# Patient Record
Sex: MALE | Race: WHITE
[De-identification: names, ages, dates, MRNs, and addresses within clinical notes are randomized per-mention and may not be internally consistent; named-entity substitution may affect disease eponyms.]

---

## 2018-01-05 ENCOUNTER — HOSPITAL ENCOUNTER (INPATIENT)
Dept: HOSPITAL 11 - JP.MS | Age: 83
LOS: 5 days | Discharge: HOME HEALTH | DRG: 195 | End: 2018-01-10
Attending: INTERNAL MEDICINE | Admitting: INTERNAL MEDICINE
Payer: MEDICARE

## 2018-01-05 DIAGNOSIS — I25.10: ICD-10-CM

## 2018-01-05 DIAGNOSIS — R60.9: ICD-10-CM

## 2018-01-05 DIAGNOSIS — Z79.01: ICD-10-CM

## 2018-01-05 DIAGNOSIS — R19.7: ICD-10-CM

## 2018-01-05 DIAGNOSIS — Z86.73: ICD-10-CM

## 2018-01-05 DIAGNOSIS — Z79.84: ICD-10-CM

## 2018-01-05 DIAGNOSIS — I10: ICD-10-CM

## 2018-01-05 DIAGNOSIS — Z95.2: ICD-10-CM

## 2018-01-05 DIAGNOSIS — Z87.891: ICD-10-CM

## 2018-01-05 DIAGNOSIS — E11.65: ICD-10-CM

## 2018-01-05 DIAGNOSIS — H10.33: ICD-10-CM

## 2018-01-05 DIAGNOSIS — J18.1: Primary | ICD-10-CM

## 2018-01-05 DIAGNOSIS — R09.02: ICD-10-CM

## 2018-01-05 DIAGNOSIS — Z66: ICD-10-CM

## 2018-01-06 RX ADMIN — LEVOFLOXACIN SCH MLS/HR: 750 INJECTION, SOLUTION INTRAVENOUS at 08:47

## 2018-01-06 RX ADMIN — DILTIAZEM HYDROCHLORIDE SCH: 60 CAPSULE, EXTENDED RELEASE ORAL at 11:10

## 2018-01-06 RX ADMIN — DEXTROSE SCH MG: 50 INJECTION, SOLUTION INTRAVENOUS at 18:50

## 2018-01-06 RX ADMIN — POTASSIUM CHLORIDE SCH MEQ: 1500 TABLET, EXTENDED RELEASE ORAL at 08:46

## 2018-01-06 RX ADMIN — GUAIFENESIN AND CODEINE PHOSPHATE SCH ML: 10; 100 LIQUID ORAL at 13:37

## 2018-01-06 RX ADMIN — POTASSIUM CHLORIDE SCH MEQ: 1500 TABLET, EXTENDED RELEASE ORAL at 16:43

## 2018-01-06 RX ADMIN — GUAIFENESIN AND CODEINE PHOSPHATE SCH ML: 10; 100 LIQUID ORAL at 21:34

## 2018-01-06 RX ADMIN — GUAIFENESIN AND CODEINE PHOSPHATE SCH ML: 10; 100 LIQUID ORAL at 08:47

## 2018-01-06 RX ADMIN — DEXTROSE SCH MG: 50 INJECTION, SOLUTION INTRAVENOUS at 10:29

## 2018-01-06 NOTE — PCM.HP
H&P History of Present Illness





- General


Date of Service: 18


Admit Problem/Dx: 


 Admission Diagnosis/Problem





Admission Diagnosis/Problem      Pneumonia








Source of Information: Patient, EMS Notes Reviewed


History Limitations: Reports: Respiratory Distress





- History of Present Illness


Initial Comments - Free Text/Narative: 





Pneumonia; this is a 86 year old male, who is a direct admit from [facility] ER, there hospital is divert, therefore we are accepting admission 

for further care and treatment. Mr. Petersen arrrives by EMS.





Mr. Petersen reports a 4 day history of cough, congestion, bilateral eye 

infection. He became progressively weaker with shortness of breath came to ER 

for treatment from his home at [facility]. In the ER  he 

had labs which show a WBC 12,000, lactic acid 2.7, influenza A and B negative, 

Chest xray right lower lobe infiltrates. He was given one liter of fluid, 

Levaquin 750mg IV, Tamiflu 75mg po once, oxygen at 4liter per NC O2 sat at 90%.





Mr. Petersen has COPD, has home bi-pap for nighttime with oxygen liter flow at 5. 





Surgeries; 2013; open heart for valve replacement





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





Social; , Wife of 62 .5 years  3 year ago from complication of 

arthritis, children and extended family in the area, retired Farmer of crops 

and cattle. lives alone in [facility].








Onset of Symptoms: Reports: Gradual


Duration of Symptoms: Reports: Day(s):


Location: Reports: Generalized


Quality: Reports: Other (shortness of breath)


Severity: Severe


Improves with: Reports: None


Worsens with: Reports: None


Associated Symptoms: Reports: Cough, Fever/Chills, Loss of Appetite, Malaise, 

Shortness of Breath, Weakness


  ** Bilateral Eye


Pain Score (Numeric/FACES): 2





- Related Data


Allergies/Adverse Reactions: 


 Allergies











Allergy/AdvReac Type Severity Reaction Status Date / Time


 


No Known Allergies Allergy   Verified 18 01:03











Home Medications: 


 Home Meds





Acetaminophen [Mapap] 650 mg PO Q6H PRN 18 [History]


Acetaminophen/HYDROcodone [Norco 325-5 MG] 0.5 - 1 tab PO Q4H PRN 18 [

History]


Albuterol Sulfate 2.5 mg IH Q4H PRN 18 [History]


Amiodarone [Cordarone] 200 mg PO QAM 18 [History]


Ascorbic Acid 1,000 mg PO DAILY 18 [History]


Capsaicin [Zostrix 0.025% Crm] 0.025 gm TP ASDIRECTED PRN 18 [History]


Digoxin 125 mcg PO DAILY 18 [History]


Diltiazem [Cardizem SR] 60 mg PO DAILY 18 [History]


Docusate Sodium 100 mg PO BID 18 [History]


Ferrous Sulfate 325 mg PO BID 18 [History]


Folic Acid 1 mg PO DAILY 18 [History]


Furosemide [Lasix] 40 mg PO BID 18 [History]


Gentamicin [Gentak 0.3% Ophth Oint] 1 - 2 drop EYEBOTH Q4HWA 18 [History]


Metoprolol Tartrate 25 mg PO BID 18 [History]


Multivits,Ca,Min/Iron/FA/Lycop [Centrum Men's Tablet] 1 tab PO QAM 18 [

History]


Omega-3S/DHA/Epa/Fish Oil [Fish Oil Omega-3 Softgel] 1 cap PO BID 18 [

History]


Omeprazole 20 mg PO BIDAC 18 [History]


Polyvinyl Alcohol [Liquitears] 1 drop EYEBOTH ASDIRECTED PRN 18 [History]


Potassium Chloride [Klor-Con M20] 40 meq PO BID 18 [History]


Warfarin Sodium [Coumadin] 4 mg PO DAILY 18 [History]


diphenhydrAMINE [Benadryl] 50 mg PO BEDTIME PRN 18 [History]











Past Medical History


Cardiovascular History: Reports: Heart Valve Replacement


Other Cardiovascular History: 2 valves


Neurological History: Reports: CVA





- Past Surgical History


Cardiovascular Surgical History: Reports: Valve Replacement





Social & Family History





- Tobacco Use


Smoking Status *Q: Former Smoker


Used Tobacco, but Quit: Yes


Month Tobacco Last Used: 35 years ago


Second Hand Smoke Exposure: No





- Caffeine Use


Caffeine Use: Reports: None





- Recreational Drug Use


Recreational Drug Use: No





- Living Situation & Occupation


Living situation: Reports: 


Occupation: Retired (wife  3 years ago,  for 62.5 years, children 

and extended family in the area, retired Farmer of crops and cattle. lives in 

[address].)





H&P Review of Systems





- Review of Systems:


Review Of Systems: See Below


General: Reports: Fever, Chills, Malaise, Weakness, Fatigue, Decreased Appetite


HEENT: Reports: Rhinitis, Post Nasal Drip, Sinus Congestion, Visual Changes (

bilateral acute eye infections)


Pulmonary: Reports: Shortness of Breath, Wheezing, Pleuritic Chest Pain, Cough, 

Sputum, Other (home Bi-pap with 5 liter of oxygen at night. does not use O2 

during the day.)


Cardiovascular: Reports: Dyspnea on Exertion


Genitourinary: Reports: No Symptoms


Musculoskeletal: Reports: No Symptoms


Skin: Reports: No Symptoms


Psychiatric: Reports: No Symptoms


Neurological: Reports: No Symptoms


Hematologic/Lymphatic: Reports: No Symptoms


Immunologic: Reports: No Symptoms





Exam





- Exam


Exam: See Below





- Vital Signs


Vital Signs: 


 Last Vital Signs











Temp  36.4 C   18 07:26


 


Pulse  60   18 07:26


 


Resp  18   18 07:26


 


BP  118/65   18 07:26


 


Pulse Ox  94 L  18 07:26











Weight: 111.754 kg





- Exam


Quality Assessment: Supplemental Oxygen


General: Alert, Oriented, Cooperative, Mild Distress


HEENT: Pupils Equal, Rhinitis (thick green/yellow discharge), Other (bilateral 

conjunctivitis; sclera injection, mucupurulent discharge from left eye. facial 

edema.)


Neck: Supple, Trachea Midline


Lungs: Decreased Breath Sounds, Crackles, Rales (bilateral)


Cardiovascular: Regular Rate, Regular Rhythm, Normal S1, Normal S2, Other (mid-

line old well healed surgical incision from open heart surgery.)


GI/Abdominal Exam: Normal Bowel Sounds, Soft, Non-Tender, Other (large obese 

abdomen, normal shape and size per patient.)


 (Male) Exam: Deferred


Rectal (Males) Exam: Deferred


Back Exam: Normal Inspection, Full Range of Motion


Extremities: Normal Inspection, Normal Range of Motion, Non-Tender, No Pedal 

Edema, Normal Capillary Refill


Peripheral Pulses: 2+: Posterior Tibial (L), Posterior Tibial (R), Dorsalis 

Pedis (L), Dorsalis Pedis (R)


Skin: Warm, Dry, Intact, Other (bilateral erythema surrounding eyes.)


Neurological: Cranial Nerves Intact, Reflexes Equal Bilateral


Neuro Extensive - Mental Status: Alert, Oriented x3, Normal Mood/Affect, Normal 

Cognition, Memory Intact


Neuro Extensive - Motor, Sensory, Reflexes: Motor/Sensory Deficits


Psychiatric: Alert, Normal Affect, Normal Mood





- Patient Data


Lab Results Last 24 hrs: 


 Laboratory Results - last 24 hr











  18 Range/Units





  01:21 01:21 05:50 


 


WBC    11.5 H  (4.5-11.0)  K/uL


 


RBC    4.87  (4.30-5.90)  M/uL


 


Hgb    14.8  (12.0-15.0)  g/dL


 


Hct    45.7  (40.0-54.0)  %


 


MCV    94  (80-98)  fL


 


MCH    30  (27-31)  pg


 


MCHC    32  (32-36)  %


 


Plt Count    156  (150-400)  K/uL


 


Neut % (Auto)    90 H  (36-66)  %


 


Lymph % (Auto)    6 L  (24-44)  %


 


Mono % (Auto)    4  (2-6)  %


 


Eos % (Auto)    0 L  (2-4)  %


 


Baso % (Auto)    0  (0-1)  %


 


PT     (9.5-12.0)  sec


 


INR     (0.80-1.20)  


 


Sodium     (140-148)  mmol/L


 


Potassium     (3.6-5.2)  mmol/L


 


Chloride     (100-108)  mmol/L


 


Carbon Dioxide     (21-32)  mmol/L


 


Anion Gap     (5.0-14.0)  mmol/L


 


BUN     (7-18)  mg/dL


 


Creatinine     (0.8-1.3)  mg/dL


 


Est Cr Clr Drug Dosing     mL/min


 


Estimated GFR (MDRD)     (>60)  


 


Glucose     ()  mg/dL


 


Lactic Acid   2.4 H   (0.4-2.0)  mmol/L


 


Calcium     (8.5-10.1)  mg/dL


 


Total Bilirubin     (0.2-1.0)  mg/dL


 


AST     (15-37)  U/L


 


ALT     (12-78)  U/L


 


Alkaline Phosphatase     ()  U/L


 


C-Reactive Protein  17.42 H    (0.0-0.3)  mg/dL


 


Total Protein     (6.4-8.2)  g/dL


 


Albumin     (3.4-5.0)  g/dL


 


Globulin     (2.3-3.5)  g/dL


 


Albumin/Globulin Ratio     (1.2-2.2)  














  18 Range/Units





  05:50 05:50 


 


WBC    (4.5-11.0)  K/uL


 


RBC    (4.30-5.90)  M/uL


 


Hgb    (12.0-15.0)  g/dL


 


Hct    (40.0-54.0)  %


 


MCV    (80-98)  fL


 


MCH    (27-31)  pg


 


MCHC    (32-36)  %


 


Plt Count    (150-400)  K/uL


 


Neut % (Auto)    (36-66)  %


 


Lymph % (Auto)    (24-44)  %


 


Mono % (Auto)    (2-6)  %


 


Eos % (Auto)    (2-4)  %


 


Baso % (Auto)    (0-1)  %


 


PT   40.8 H  (9.5-12.0)  sec


 


INR   3.61 H  (0.80-1.20)  


 


Sodium  140   (140-148)  mmol/L


 


Potassium  3.9   (3.6-5.2)  mmol/L


 


Chloride  102   (100-108)  mmol/L


 


Carbon Dioxide  31   (21-32)  mmol/L


 


Anion Gap  7.4   (5.0-14.0)  mmol/L


 


BUN  28 H   (7-18)  mg/dL


 


Creatinine  0.9   (0.8-1.3)  mg/dL


 


Est Cr Clr Drug Dosing  60.83   mL/min


 


Estimated GFR (MDRD)  > 60   (>60)  


 


Glucose  202 H   ()  mg/dL


 


Lactic Acid    (0.4-2.0)  mmol/L


 


Calcium  7.5 L   (8.5-10.1)  mg/dL


 


Total Bilirubin  0.7   (0.2-1.0)  mg/dL


 


AST  46 H   (15-37)  U/L


 


ALT  41   (12-78)  U/L


 


Alkaline Phosphatase  62   ()  U/L


 


C-Reactive Protein    (0.0-0.3)  mg/dL


 


Total Protein  4.4 L   (6.4-8.2)  g/dL


 


Albumin  1.4 L   (3.4-5.0)  g/dL


 


Globulin  3.0   (2.3-3.5)  g/dL


 


Albumin/Globulin Ratio  0.5 L   (1.2-2.2)  











Result Diagrams: 


 18 05:50





 18 05:50


Mark Anthony Results Last 24 hrs: 


 Microbiology











 18 01:36 Gram Stain - Final





 Sputum - Expectorated 














*Q Meaningful Use (ADM)





- VTE *Q


VTE Criteria *Q: 








- Stroke *Q


Stroke Criteria *Q: 








- AMI *Q


AMI Criteria *Q: 








- Problem List


(1) Pneumonia


SNOMED Code(s): 710736835


   ICD Code: J18.9 - PNEUMONIA, UNSPECIFIED ORGANISM   Status: Acute   Priority

: High   Current Visit: Yes   


Qualifiers: 


   Pneumonia type: due to unspecified organism   Laterality: right   Lung 

location: lower lobe of lung   Qualified Code(s): J18.1 - Lobar pneumonia, 

unspecified organism   





(2) HTN (hypertension)


SNOMED Code(s): 23294281


   ICD Code: I10 - ESSENTIAL (PRIMARY) HYPERTENSION   Status: Acute   Priority: 

High   Current Visit: Yes   


Qualifiers: 


   Hypertension type: essential hypertension   Qualified Code(s): I10 - 

Essential (primary) hypertension   





(3) Conjunctivitis of both eyes


SNOMED Code(s): 3760881


   ICD Code: H10.9 - UNSPECIFIED CONJUNCTIVITIS   Status: Acute   Priority: 

High   Current Visit: Yes   


Qualifiers: 


   Conjunctivitis type: acute   Acute conjunctivitis type: bacterial   

Qualified Code(s): H10.33 - Unspecified acute conjunctivitis, bilateral   





(4) History of artificial heart valve


Status: Acute   Priority: High   Current Visit: Yes   


Problem List Initiated/Reviewed/Updated: Yes


Orders Last 24hrs: 


 Active Orders 24 hr











 Category Date Time Status


 


 Patient Status [ADT] Routine ADT  18 01:04 Active


 


 Bedrest Bathroom Privileges [RC] ASDIRECTED Care  18 01:04 Active


 


 Intake and Output [RC] QSHIFT Care  18 01:04 Active


 


 Notify Provider Vital Signs [RC] ASDIRECTED Care  18 01:04 Active


 


 Oxygen Therapy [RC] PRN Care  18 01:04 Active


 


 Pulse Oximetry [RC] CONTINUOUS Care  18 01:04 Active


 


 RT Aerosol Therapy [RC] ASDIRECTED Care  18 01:04 Active


 


 RT Incentive Spirometry [RC] ASDIRECTED Care  18 01:04 Active


 


 VTE/DVT Education [RC] Per Unit Routine Care  18 01:04 Active


 


 Vital Signs [RC] Q4H Care  18 01:04 Active


 


 OT Evaluation and Treatment [CONS] Routine Cons  18 01:04 Active


 


 PT Evaluation and Treatment [CONS] Routine Cons  18 01:04 Active


 


 Regular Diet [DIET] Diet  18 Breakfast Active


 


 Chest 1V Frontal [CR] AM Exams  18 05:11 Taken


 


 CULTURE RESPIRATORY + SMEAR [RM] Stat Lab  18 01:36 Results


 


 UA W/MICROSCOPIC [URIN] Urgent Lab  18 01:04 Uncollected


 


 Acetaminophen [Tylenol] Med  18 01:04 Active





 650 mg PO Q4H PRN   


 


 Acetaminophen/HYDROcodone [Norco 325-5 MG] Med  18 01:04 Active





 1 tab PO Q4H PRN   


 


 Albuterol [Proventil Neb Soln] Med  18 01:04 Active





 2.5 mg NEB Q4H PRN   


 


 Albuterol/Ipratropium [DuoNeb 3.0-0.5 MG/3 ML] Med  18 07:00 Active





 3 ml NEB QIDRT   


 


 Amiodarone [Cordarone] Med  18 09:00 Active





 200 mg PO QAM   


 


 Ascorbic Acid [Vitamin C] Med  18 09:00 Active





 1,000 mg PO DAILY   


 


 Bisacodyl [Dulcolax] Med  18 01:04 Active





 5 mg PO DAILY PRN   


 


 Capsaicin [Zostrix 0.025% Crm] Med  18 01:04 Active





 0.025 gm TOP ASDIRECTED PRN   


 


 Codeine/guaiFENesin [Robitussin AC] Med  18 01:04 Active





 10 ml PO Q6H   


 


 Digoxin [Lanoxin] Med  18 09:00 Active





 125 mcg PO DAILY   


 


 Diltiazem [Cardizem SR] Med  18 09:00 Active





 60 mg PO DAILY   


 


 Docusate Sodium [Colace] Med  18 01:04 Active





 100 mg PO BID PRN   


 


 Ferrous Sulfate Med  18 09:00 Active





 325 mg PO BID   


 


 Folic Acid Med  18 09:00 Active





 1 mg PO DAILY   


 


 Furosemide [Lasix] Med  18 08:00 Active





 40 mg PO BIDDIURETIC   


 


 Gentamicin [Gentak 0.3% Ophth Oint] Med  18 01:04 Pending





 DOSE gm EYEBOTH Q4HWA   


 


 Hypromellose [Natural Balance Tears] Med  18 01:04 Active





 1 ml EYEBOTH ASDIRECTED PRN   


 


 LORazepam [Ativan] Med  18 01:04 Active





 1 mg IV Q6H PRN   


 


 Levofloxacin/Dextrose 5%-Water [Levaquin in D5W 750 MG/ Med  18 21:00 

Active





 150 ML] 750 mg   





 Premix Bag 1 bag   





 IV Q24H   


 


 Magnesium Hydroxide [Milk of Magnesia] Med  18 01:04 Active





 30 ml PO Q12H PRN   


 


 Melatonin Med  18 01:04 Active





 6 mg PO BEDTIME PRN   


 


 Metoprolol Tartrate [Lopressor] Med  18 09:00 Active





 25 mg PO BID   


 


 Morphine Med  18 01:04 Active





 2 mg IVPUSH Q2H PRN   


 


 Ondansetron [Zofran ODT] Med  18 01:04 Active





 4 mg PO Q6H PRN   


 


 Ondansetron [Zofran] Med  18 01:04 Active





 4 mg IV Q4H PRN   


 


 Pantoprazole [ProTONIX IV***] Med  18 07:30 Active





 40 mg IV ACBREAKFAST   


 


 Pneumococcal Polyvalent-23 Vac [Pneumovax 23] Med  18 14:00 Once





 0.5 ml IM .ONCE ONE   


 


 Potassium Chloride [Klor-Con M20] Med  18 09:00 Active





 40 meq PO BID   


 


 Sodium Chloride 0.9% [Normal Saline] 1,000 ml Med  18 01:04 Active





 IV ASDIRECTED   


 


 Warfarin [Coumadin] Med  18 14:00 Active





 4 mg PO DAILY@1400   


 


 Zolpidem [Ambien] Med  18 01:04 Active





 5 mg PO BEDTIME PRN   


 


 cefTRIAXone [Rocephin] 2 gm Med  18 01:30 Active





 Sodium Chloride 0.9% [Normal Saline] 50 ml   





 IV Q24H   


 


 diphenhydrAMINE [Benadryl] Med  18 01:04 Active





 50 mg PO BEDTIME PRN   


 


 methylPREDNISolone Sod Succ [Solu-MEDROL] Med  18 02:00 Active





 125 mg IVPUSH Q8H   


 


 Blood Culture x2 Reflex Set [OM.PC] Urgent Oth  18 01:04 Ordered


 


 Give supplemental Oxygen PRN [COMM] Routine Oth  18 01:04 Ordered


 


 Resuscitation Status Routine Resus Stat  18 00:23 Ordered








 Medication Orders





Acetaminophen (Tylenol)  650 mg PO Q4H PRN


   PRN Reason: Pain (Mild 1-3)/fever


Hydrocodone Bitart/Acetaminophen (Norco 325-5 Mg)  1 tab PO Q4H PRN


   PRN Reason: Pain (moderate 4-6)


Albuterol (Proventil Neb Soln)  2.5 mg NEB Q4H PRN


   PRN Reason: Shortness Of Breath/wheezing


Albuterol/Ipratropium (Duoneb 3.0-0.5 Mg/3 Ml)  3 ml NEB QIDRT HESHAM


Amiodarone HCl (Cordarone)  200 mg PO QAM HESHAM


Artificial Tears (Natural Balance Tears)  1 ml EYEBOTH ASDIRECTED PRN


   PRN Reason: Dry Eyes


Ascorbic Acid (Vitamin C)  1,000 mg PO DAILY HESAHM


Bisacodyl (Dulcolax)  5 mg PO DAILY PRN


   PRN Reason: Constipation


Capsaicin (Zostrix 0.025% Crm)  0.025 gm TOP ASDIRECTED PRN


   PRN Reason: Pain


Digoxin (Lanoxin)  125 mcg PO DAILY HESHAM


Diltiazem HCl (Cardizem Sr)  60 mg PO DAILY HESHAM


Diphenhydramine HCl (Benadryl)  50 mg PO BEDTIME PRN


   PRN Reason: Sleep


Docusate Sodium (Colace)  100 mg PO BID PRN


   PRN Reason: Constipation


Ferrous Sulfate (Ferrous Sulfate)  325 mg PO BID HESHAM


Folic Acid (Folic Acid)  1 mg PO DAILY HESHAM


Furosemide (Lasix)  40 mg PO BIDDIURETIC HESHAM


Gentamicin Sulfate (Gentak 0.3% Ophth Oint)   gm EYEBOTH Q4HWA HESHAM


Guaifenesin/Codeine Phosphate (Robitussin Ac)  10 ml PO Q6H HESHAM


   Last Admin: 18 02:19 Dose:  Not Given


Levofloxacin/Dextrose 750 mg/ (Premix)  150 mls @ 100 mls/hr IV Q24H HESHAM


Sodium Chloride (Normal Saline)  1,000 mls @ 125 mls/hr IV ASDIRECTED [facility]


   Last Admin: 18 02:27  Dose: 125 mls/hr


Ceftriaxone Sodium 2 gm/ (Sodium Chloride)  50 mls @ 100 mls/hr IV Q24H [facility]


   Last Admin: 18 02:19  Dose: 100 mls/hr


Lorazepam (Ativan)  1 mg IV Q6H PRN


   PRN Reason: Nausea/Vomiting


Magnesium Hydroxide (Milk Of Magnesia)  30 ml PO Q12H PRN


   PRN Reason: Constipation


Melatonin (Melatonin)  6 mg PO BEDTIME PRN


   PRN Reason: Insomnia


   Last Admin: 18 02:19  Dose: 6 mg


Methylprednisolone Sodium Succinate (Solu-Medrol)  125 mg IVPUSH Q8H [facility]


   Last Admin: 18 02:19  Dose: 125 mg


Metoprolol Tartrate (Lopressor)  25 mg PO BID [facility]


Morphine Sulfate (Morphine)  2 mg IVPUSH Q2H PRN


   PRN Reason: Pain (severe 7-10)


Ondansetron HCl (Zofran Odt)  4 mg PO Q6H PRN


   PRN Reason: Nausea able to take PO


Ondansetron HCl (Zofran)  4 mg IV Q4H PRN


   PRN Reason: Nausea/Vomiting


Pantoprazole Sodium (Protonix Iv***)  40 mg IV ACBREAKFAST [facility]


Pneumococcal Polyvalent Vaccine (Pneumovax 23)  0.5 ml IM .ONCE ONE


   Stop: 18 14:01


Potassium Chloride (Klor-Con M20)  40 meq PO BID [facility]


Warfarin Sodium (Coumadin)  4 mg PO DAILY@1400 [facility]


Zolpidem Tartrate (Ambien)  5 mg PO BEDTIME PRN


   PRN Reason: Sleep








Assessment/Plan Comment:: 








Assessment/Plan Comment:: 





ASSESSMENT AND PLAN - 


Mr. Petersen reports a 4 day history of cough, congestion, bilateral eye 

infection. He became progressively weaker with shortness of breath came to ER 

for treatment from his home at [facility]. In the ER  he 

had labs which show a WBC 12,000, lactic acid 2.7, influenza A and B negative, 

Chest xray right lower lobe infiltrates. He was given one liter of fluid, 

Levaquin 750mg IV, Tamiflu 75mg po once, oxygen at 4liter per NC O2 sat at 90%.








Right lower lober pneumonia with hypoxia  - history of COPD. He is hypoxic and 

requiring supplemental oxygen and not safe for outpatient management. No fevers 

at this time. No recent antibiotics.


-IV Levofloxacin 750mg every 24 hours


-IV Rocephin 2mg every 24 hours


-IV fluid bolus followed by continuous infusion overnight


-Sputum culture pending


-Supplement oxygen at 4 liters per NC


-Scheduled and as needed nebulizers


-IV Solumedrol 125mg mg now and every 8 hours





Essential hypertension - blood pressure currently normal but will need to be 

monitored closely with active infection. Usual medications will be continued 

unless blood pressure trends down





Hx of artificial Heart Valve replacement


-Coumadin therapy


-continue home medication





Bilateral Conjunctivitis


-Gentamycin opth ointment as directed


-eye care cleansing.





Maintenance issues - 


- DVT prophylaxis - enoxaparin


- GI prophylaxis - IV Protonix 40mg po daily


- Nutrition - regular diet


- [place] catheter - not indicated





CODE STATUS - DNR/DNI





Admission justification - This patient will be admitted for inpatient services 

and is medically appropriate meeting medical necessity for inpatient admission 

as outlined in my documentation.  I reasonably expect the patient will require 

inpatient services that span a period time over 2 midnights. I reasonably 

expect this patient to be discharged or transferred within 96 hours after 

[facility]





Disposition - anticipate discharge home after the hospital stay





Primary care physician - [facility]: Albert Arias M.D.

## 2018-01-07 RX ADMIN — POTASSIUM CHLORIDE SCH MEQ: 1500 TABLET, EXTENDED RELEASE ORAL at 16:51

## 2018-01-07 RX ADMIN — GUAIFENESIN AND CODEINE PHOSPHATE SCH ML: 10; 100 LIQUID ORAL at 20:49

## 2018-01-07 RX ADMIN — LEVOFLOXACIN SCH MLS/HR: 750 INJECTION, SOLUTION INTRAVENOUS at 08:01

## 2018-01-07 RX ADMIN — MINERAL OIL, PETROLATUM, PHENYLEPHRINE HCL PRN APPLIC: 14; 74.9; .25 OINTMENT RECTAL at 22:06

## 2018-01-07 RX ADMIN — DILTIAZEM HYDROCHLORIDE SCH MG: 60 CAPSULE, EXTENDED RELEASE ORAL at 08:01

## 2018-01-07 RX ADMIN — GUAIFENESIN AND CODEINE PHOSPHATE SCH ML: 10; 100 LIQUID ORAL at 14:46

## 2018-01-07 RX ADMIN — GUAIFENESIN AND CODEINE PHOSPHATE SCH ML: 10; 100 LIQUID ORAL at 08:09

## 2018-01-07 RX ADMIN — DEXTROSE SCH MG: 50 INJECTION, SOLUTION INTRAVENOUS at 02:52

## 2018-01-07 RX ADMIN — POTASSIUM CHLORIDE SCH MEQ: 1500 TABLET, EXTENDED RELEASE ORAL at 08:01

## 2018-01-07 NOTE — PCM.PN
- General Info


Date of Service: 01/07/18


Functional Status: Reports: Pain Controlled, Tolerating Diet





- Review of Systems


General: Denies: Fever


Pulmonary: Reports: Cough


Systems Review Comment:: 





No acute events overnight. Patient did use C Pap with oxygen overnight but is 

off supplemental oxygen this morning. Cough is getting better. Appetite is 

excellent. Strength seems to be improving but he remains weak from baseline. He 

has not had any fevers. Tolerating current medications. Blood sugars have risen 

with the use of steroids.





- Patient Data


Vitals - Most Recent: 


 Last Vital Signs











Temp  36.8 C   01/07/18 14:38


 


Pulse  60   01/07/18 14:45


 


Resp  16   01/07/18 14:38


 


BP  101/55 L  01/07/18 14:38


 


Pulse Ox  99   01/07/18 14:38











Weight - Most Recent: 111.754 kg


I&O - Last 24 Hours: 


 Intake & Output











 01/07/18 01/07/18 01/07/18





 06:59 14:59 22:59


 


Intake Total  1595 


 


Output Total 725 700 


 


Balance -725 895 











Lab Results Last 24 Hours: 


 Laboratory Results - last 24 hr











  01/07/18 01/07/18 01/07/18 Range/Units





  06:01 06:01 11:19 


 


WBC  10.4    (4.5-11.0)  K/uL


 


RBC  4.69    (4.30-5.90)  M/uL


 


Hgb  14.1    (12.0-15.0)  g/dL


 


Hct  43.8    (40.0-54.0)  %


 


MCV  93    (80-98)  fL


 


MCH  30    (27-31)  pg


 


MCHC  32    (32-36)  %


 


Plt Count  174    (150-400)  K/uL


 


PT    32.6 H  (9.5-12.0)  sec


 


INR    2.91 H  (0.80-1.20)  


 


Sodium   140   (140-148)  mmol/L


 


Potassium   4.3   (3.6-5.2)  mmol/L


 


Chloride   103   (100-108)  mmol/L


 


Carbon Dioxide   27   (21-32)  mmol/L


 


Anion Gap   9.6   (5.0-14.0)  mmol/L


 


BUN   32 H   (7-18)  mg/dL


 


Creatinine   1.0   (0.8-1.3)  mg/dL


 


Est Cr Clr Drug Dosing   54.75   mL/min


 


Estimated GFR (MDRD)   > 60   (>60)  


 


Glucose   296 H   ()  mg/dL


 


Calcium   7.3 L   (8.5-10.1)  mg/dL











Mark Anthony Results Last 24 Hours: 


 Microbiology











 01/06/18 01:36 Gram Stain - Final





 Sputum - Expectorated Respiratory Culture - Preliminary





    NORMAL RESPIRATORY BERT 1 DAY











Med Orders - Current: 


 Current Medications





Acetaminophen (Tylenol)  650 mg PO Q4H PRN


   PRN Reason: Pain (Mild 1-3)/fever


Hydrocodone Bitart/Acetaminophen (Norco 325-5 Mg)  1 tab PO Q4H PRN


   PRN Reason: Pain (moderate 4-6)


Albuterol (Proventil Neb Soln)  2.5 mg NEB Q4H PRN


   PRN Reason: Shortness Of Breath/wheezing


Albuterol/Ipratropium (Duoneb 3.0-0.5 Mg/3 Ml)  3 ml NEB QIDRT [facility]


   Last Admin: 01/07/18 14:24 Dose:  3 ml


Amiodarone HCl (Cordarone)  200 mg PO QAM [facility]


   Last Admin: 01/07/18 08:02 Dose:  200 mg


Artificial Tears (Natural Balance Tears)  0 ml EYEBOTH ASDIRECTED PRN


   PRN Reason: Dry Eyes


Ascorbic Acid (Vitamin C)  1,000 mg PO DAILY [facility]


   Last Admin: 01/07/18 08:02 Dose:  1,000 mg


Bisacodyl (Dulcolax)  5 mg PO DAILY PRN


   PRN Reason: Constipation


Capsaicin (Zostrix 0.025% Crm)  0 gm TOP ASDIRECTED PRN


   PRN Reason: Pain


Digoxin (Lanoxin)  125 mcg PO DAILY@1300 [facility]


   Last Admin: 01/07/18 14:45 Dose:  125 mcg


Diltiazem HCl (Cardizem Sr)  60 mg PO DAILY [facility]


   Last Admin: 01/07/18 08:01 Dose:  60 mg


Diphenhydramine HCl (Benadryl)  50 mg PO BEDTIME PRN


   PRN Reason: Sleep


   Last Admin: 01/06/18 23:52 Dose:  50 mg


Docusate Sodium (Colace)  100 mg PO BID PRN


   PRN Reason: Constipation


Folic Acid (Folic Acid)  1 mg PO DAILY [facility]


   Last Admin: 01/07/18 08:02 Dose:  1 mg


Furosemide (Lasix)  40 mg PO BIDDIURETIC [facility]


   Last Admin: 01/07/18 14:46 Dose:  40 mg


Gentamicin Sulfate (Garamycin 0.3% Ophth Soln)  0 ml EYEBOTH Q4HWA [facility]


   Stop: 01/10/18 23:01


   Last Admin: 01/07/18 12:00 Dose:  2 drop


Guaifenesin/Codeine Phosphate (Robitussin Ac)  10 ml PO TID [facility]


   Last Admin: 01/07/18 14:46 Dose:  10 ml


Levofloxacin/Dextrose 750 mg/ (Premix)  150 mls @ 100 mls/hr IV Q24H [facility]


   Last Admin: 01/07/18 08:01 Dose:  100 mls/hr


Insulin Aspart (Novolog)  0 unit SUBCUT QIDACANDBED [facility]


   PRN Reason: Protocol


Magnesium Hydroxide (Milk Of Magnesia)  30 ml PO Q12H PRN


   PRN Reason: Constipation


Melatonin (Melatonin)  6 mg PO BEDTIME PRN


   PRN Reason: Insomnia


   Last Admin: 01/06/18 21:42 Dose:  6 mg


Metformin HCl (Glucophage)  500 mg PO ACBREAKFAST [facility]


   Last Admin: 01/07/18 08:00 Dose:  500 mg


Metformin HCl (Glucophage)  1,000 mg PO WITHDINNER [facility]


   Last Admin: 01/06/18 16:43 Dose:  1,000 mg


Metoprolol Tartrate (Lopressor)  25 mg PO BID [facility]


   Last Admin: 01/07/18 08:02 Dose:  25 mg


Morphine Sulfate (Morphine)  2 mg IVPUSH Q2H PRN


   PRN Reason: Pain (severe 7-10)


Ondansetron HCl (Zofran Odt)  4 mg PO Q6H PRN


   PRN Reason: Nausea able to take PO


Ondansetron HCl (Zofran)  4 mg IV Q4H PRN


   PRN Reason: Nausea/Vomiting


Pantoprazole Sodium (Protonix***)  40 mg PO ACBREAKFAST [facility]


   Last Admin: 01/07/18 08:00 Dose:  40 mg


Phenyleph/Shark Oil/Min Oil/Petrol (Preparation H Oint)  0 gm RECTAL ASDIRECTED 

PRN


   PRN Reason: Hemorrhoids


Pneumococcal Polyvalent Vaccine (Pneumovax 23)  0.5 ml IM .ONCE ONE


   Stop: 01/08/18 14:01


Potassium Chloride (Klor-Con M20)  40 meq PO BIDMEALS [facility]


   Last Admin: 01/07/18 08:01 Dose:  40 meq


Prednisone (Prednisone)  20 mg PO BIDAC [facility]


   Last Admin: 01/07/18 08:01 Dose:  20 mg


Warfarin Sodium (Coumadin)  4 mg PO DAILY@1300 [facility]


   Last Admin: 01/07/18 14:45 Dose:  4 mg


Zolpidem Tartrate (Ambien)  5 mg PO BEDTIME PRN


   PRN Reason: Sleep





Discontinued Medications





Ferrous Sulfate (Ferrous Sulfate)  325 mg PO BID [facility]


   Last Admin: 01/06/18 08:47 Dose:  325 mg


Guaifenesin/Codeine Phosphate (Robitussin Ac)  10 ml PO Q6H [facility]


   Last Admin: 01/06/18 02:19 Dose:  Not Given


Guaifenesin/Codeine Phosphate (Robitussin Ac)  10 ml PO Q6H [facility]


   Last Admin: 01/06/18 13:37 Dose:  10 ml


Ceftriaxone Sodium 2 gm/ (Sodium Chloride)  50 mls @ 100 mls/hr IV Q8H [facility]


   Last Admin: 01/06/18 02:35 Dose:  Not Given


Sodium Chloride (Normal Saline)  1,000 mls @ 125 mls/hr IV ASDIRECTED [facility]


   Last Admin: 01/06/18 08:52 Dose:  125 mls/hr


Ceftriaxone Sodium 2 gm/ (Sodium Chloride)  50 mls @ 100 mls/hr IV Q24H [facility]


   Last Admin: 01/06/18 02:19 Dose:  100 mls/hr


Ceftriaxone Sodium 2 gm/ (Sodium Chloride)  50 mls @ 100 mls/hr IV Q24H [facility]


   Last Admin: 01/06/18 21:39 Dose:  100 mls/hr


Sodium Chloride (Normal Saline)  1,000 mls @ 75 mls/hr IV ASDIRECTED [facility]


   Last Admin: 01/06/18 21:39 Dose:  75 mls/hr


Lorazepam (Ativan)  1 mg IV Q6H PRN


   PRN Reason: Nausea/Vomiting


Methylprednisolone Sodium Succinate (Solu-Medrol)  125 mg IVPUSH Q8H [facility]


   Last Admin: 01/06/18 02:19 Dose:  125 mg


Methylprednisolone Sodium Succinate (Solu-Medrol)  62.5 mg IVPUSH Q8H [facility]


   Stop: 01/07/18 02:00


   Last Admin: 01/07/18 02:52 Dose:  62.5 mg


Warfarin Sodium (Coumadin)  4 mg PO DAILY@1300 [facility]











- Exam


Quality Assessment: No: Supplemental Oxygen


General: Alert, Oriented, Cooperative, No Acute Distress


Neck: Supple


Lungs: Normal Respiratory Effort, Crackles (right lung base)


Cardiovascular: Regular Rate, Regular Rhythm


GI/Abdominal Exam: Soft, No Distention


Extremities: Pedal Edema


Psy/Mental Status: Alert, Normal Affect





- Problem List Review


Problem List Initiated/Reviewed/Updated: Yes





- My Orders


Last 24 Hours: 


My Active Orders





01/06/18 17:00


metFORMIN [Glucophage]   1,000 mg PO WITHDINNER 





01/06/18 21:00


Codeine/guaiFENesin [Robitussin AC]   10 ml PO TID 





01/07/18 07:30


metFORMIN [Glucophage]   500 mg PO ACBREAKFAST 





01/07/18 08:00


predniSONE   20 mg PO BIDAC 





01/07/18 09:28


MO/Pet,Wh/Phenylephrine/Shk Lv [Preparation H Oint]   0 gm RECTAL ASDIRECTED 

PRN 





01/07/18 11:20


Convert IV to Saline Lock [OM.PC] Routine 





01/07/18 12:54


Communication Order [RC] PRN 


Communication Order [RC] PRN 


Diabetes Education [RC] Click to Edit 


Notify Provider [RC] PRN 





01/07/18 13:00


Warfarin [Coumadin]   4 mg PO DAILY@1300 





01/07/18 16:30


GLUCOSE POC LAB TO COLLECT [POC] QIDACANDBED 





01/07/18 17:00


Insulin Aspart [NovoLOG]   See Protocol  SUBCUT QIDACANDBED 





01/07/18 21:00


GLUCOSE POC LAB TO COLLECT [POC] QIDACANDBED 





01/08/18 05:00


BASIC METABOLIC PANEL,BMP [CHEM] Timed 


CBC W/O DIFF,HEMOGRAM [HEME] Timed (1) 


INR,PT,PROTHROMBIN TIME [COAG] Timed 





01/08/18 07:30


GLUCOSE POC LAB TO COLLECT [POC] QIDACANDBED 





01/08/18 11:30


GLUCOSE POC LAB TO COLLECT [POC] QIDACANDBED 





01/08/18 14:00


Pneumococcal Polyvalent-23 Vac [Pneumovax 23]   0.5 ml IM .ONCE ONE 





01/08/18 16:30


GLUCOSE POC LAB TO COLLECT [POC] QIDACANDBED 





01/08/18 21:00


GLUCOSE POC LAB TO COLLECT [POC] QIDACANDBED 





01/09/18 07:30


GLUCOSE POC LAB TO COLLECT [POC] QIDACANDBED 





01/09/18 11:30


GLUCOSE POC LAB TO COLLECT [POC] QIDACANDBED 





01/09/18 16:30


GLUCOSE POC LAB TO COLLECT [POC] QIDACANDBED 





01/09/18 21:00


GLUCOSE POC LAB TO COLLECT [POC] QIDACANDBED 





01/10/18 07:30


GLUCOSE POC LAB TO COLLECT [POC] QIDACANDBED 





01/10/18 11:30


GLUCOSE POC LAB TO COLLECT [POC] QIDACANDBED 





01/10/18 16:30


GLUCOSE POC LAB TO COLLECT [POC] QIDACANDBED 





01/10/18 21:00


GLUCOSE POC LAB TO COLLECT [POC] QIDACANDBED 





01/11/18 07:30


GLUCOSE POC LAB TO COLLECT [POC] QIDACANDBED 





01/11/18 11:30


GLUCOSE POC LAB TO COLLECT [POC] QIDACANDBED 














- Plan


Plan:: 








ASSESSMENT AND PLAN - 





Right lower lober pneumonia with hypoxia  - clinically doing better and off 

supplemental oxygen this morning. Did require some oxygen overnight. He has not 

had any fevers. Tolerating current antibiotics.


-IV Levofloxacin 750mg every 24 hours


-IV Rocephin 2mg every 24 hours


-Saline lock IV


-Follow-up sputum culture


-Supplement oxygen at 4 liters per NC


-Scheduled and as needed nebulizers


-Transition to prednisone





Diabetes mellitus type 2 with Steroid-induced hyperglycemia - sugars have risen 

with steroids.


-Continue metformin


-High-dose sliding scale insulin





Essential hypertension - blood pressure currently normal but will need to be 

monitored closely with active infection. Usual medications will be continued 

unless blood pressure trends down





Hx of artificial Heart Valve replacement - INR therapeutic


-Coumadin therapy


-continue home medication





Bilateral Conjunctivitis - clinically much better


-Gentamycin opth ointment as directed


-eye care cleansing





Maintenance issues - 


- DVT prophylaxis - warfarin


- GI prophylaxis - PPI


- Nutrition - regular diet


- [place] catheter - not indicated





Disposition - anticipate discharge home after the hospital stay





Primary care physician - [facility]





Albert Arias M.D.

## 2018-01-08 RX ADMIN — POTASSIUM CHLORIDE SCH MEQ: 1500 TABLET, EXTENDED RELEASE ORAL at 07:57

## 2018-01-08 RX ADMIN — DILTIAZEM HYDROCHLORIDE SCH MG: 60 CAPSULE, EXTENDED RELEASE ORAL at 09:37

## 2018-01-08 RX ADMIN — GUAIFENESIN AND CODEINE PHOSPHATE SCH ML: 10; 100 LIQUID ORAL at 09:43

## 2018-01-08 RX ADMIN — Medication SCH CAP: at 13:31

## 2018-01-08 RX ADMIN — MINERAL OIL, PETROLATUM, PHENYLEPHRINE HCL PRN APPLIC: 14; 74.9; .25 OINTMENT RECTAL at 17:40

## 2018-01-08 RX ADMIN — GUAIFENESIN AND CODEINE PHOSPHATE SCH ML: 10; 100 LIQUID ORAL at 21:42

## 2018-01-08 RX ADMIN — Medication SCH CAP: at 21:42

## 2018-01-08 RX ADMIN — GUAIFENESIN AND CODEINE PHOSPHATE SCH ML: 10; 100 LIQUID ORAL at 13:31

## 2018-01-08 RX ADMIN — ALBUTEROL SULFATE PRN MG: 2.5 SOLUTION RESPIRATORY (INHALATION) at 07:43

## 2018-01-08 RX ADMIN — LEVOFLOXACIN SCH MLS/HR: 750 INJECTION, SOLUTION INTRAVENOUS at 07:46

## 2018-01-08 RX ADMIN — LEVOFLOXACIN SCH MLS/HR: 750 INJECTION, SOLUTION INTRAVENOUS at 08:35

## 2018-01-08 RX ADMIN — POTASSIUM CHLORIDE SCH MEQ: 1500 TABLET, EXTENDED RELEASE ORAL at 17:40

## 2018-01-08 NOTE — CR
Chest 1V Frontal

 

HISTORY: Pneumonia.

 

COMPARISON: None

 

FINDINGS: The sided pacemaker. Moderate cardiomegaly. Prior median sternotomy. [facility] No focal infiltrates or acute congestive change.

## 2018-01-08 NOTE — PCM.PN
- General Info


Date of Service: 01/08/18


Subjective Update: 





This patient has shown further improvement over the past 24 hours, breathing is 

been much better but not yet back to baseline. He did have an episode of 

coughing with shortness of breath earlier this morning. Vital signs otherwise 

have been stable and he has remained afebrile.


Functional Status: Reports: Tolerating Diet, Urinating





- Review of Systems


General: Denies: Fever, Weakness, Chills


Pulmonary: Reports: Shortness of Breath, Cough, Wheezing.  Denies: Pleuritic 

Chest Pain, Sputum, Hemoptysis


Cardiovascular: Reports: Dyspnea on Exertion.  Denies: Chest Pain, Palpitations

, Orthopnea, PND, Edema


Gastrointestinal: Reports: No Symptoms





- Patient Data


Vitals - Most Recent: 


 Last Vital Signs











Temp  96.6 F   01/08/18 08:04


 


Pulse  82   01/08/18 12:22


 


Resp  18   01/08/18 08:04


 


BP  106/57 L  01/08/18 09:37


 


Pulse Ox  97   01/08/18 11:24











Weight - Most Recent: 246 lb 6 oz


I&O - Last 24 Hours: 


 Intake & Output











 01/07/18 01/08/18 01/08/18





 22:59 06:59 14:59


 


Intake Total 480 650 150


 


Output Total 550 300 400


 


Balance -70 350 -250











Lab Results Last 24 Hours: 


 Laboratory Results - last 24 hr











  01/08/18 01/08/18 01/08/18 Range/Units





  06:16 06:16 06:16 


 


WBC  15.3 H    (4.5-11.0)  K/uL


 


RBC  4.77    (4.30-5.90)  M/uL


 


Hgb  14.4    (12.0-15.0)  g/dL


 


Hct  44.5    (40.0-54.0)  %


 


MCV  93    (80-98)  fL


 


MCH  30    (27-31)  pg


 


MCHC  32    (32-36)  %


 


Plt Count  212    (150-400)  K/uL


 


PT   30.1 H   (9.5-12.0)  sec


 


INR   2.70 H   (0.80-1.20)  


 


Sodium    139 L  (140-148)  mmol/L


 


Potassium    4.7  (3.6-5.2)  mmol/L


 


Chloride    105  (100-108)  mmol/L


 


Carbon Dioxide    28  (21-32)  mmol/L


 


Anion Gap    10.7  (5.0-14.0)  mmol/L


 


BUN    36 H  (7-18)  mg/dL


 


Creatinine    1.1  (0.8-1.3)  mg/dL


 


Est Cr Clr Drug Dosing    49.77  mL/min


 


Estimated GFR (MDRD)    > 60  (>60)  


 


Glucose    198 H  ()  mg/dL


 


Calcium    7.3 L  (8.5-10.1)  mg/dL











Mark Anthony Results Last 24 Hours: 


 Microbiology











 01/06/18 01:36 Gram Stain - Final





 Sputum - Expectorated Respiratory Culture - Final





    NORMAL RESPIRATORY BERT 2 DAYS











Med Orders - Current: 


 Current Medications





Acetaminophen (Tylenol)  650 mg PO Q4H PRN


   PRN Reason: Pain (Mild 1-3)/fever


Hydrocodone Bitart/Acetaminophen (Norco 325-5 Mg)  1 tab PO Q4H PRN


   PRN Reason: Pain (moderate 4-6)


Albuterol (Proventil Neb Soln)  2.5 mg NEB Q4H PRN


   PRN Reason: Shortness Of Breath/wheezing


   Last Admin: 01/08/18 07:43 Dose:  2.5 mg


Albuterol/Ipratropium (Duoneb 3.0-0.5 Mg/3 Ml)  3 ml NEB QIDRT [facility]


   Last Admin: 01/08/18 11:24 Dose:  3 ml


Amiodarone HCl (Cordarone)  200 mg PO QAM [facility]


   Last Admin: 01/08/18 09:37 Dose:  200 mg


Artificial Tears (Natural Balance Tears)  0 ml EYEBOTH ASDIRECTED PRN


   PRN Reason: Dry Eyes


Ascorbic Acid (Vitamin C)  1,000 mg PO DAILY [facility]


   Last Admin: 01/08/18 09:38 Dose:  1,000 mg


Bisacodyl (Dulcolax)  5 mg PO DAILY PRN


   PRN Reason: Constipation


Capsaicin (Zostrix 0.025% Crm)  0 gm TOP ASDIRECTED PRN


   PRN Reason: Pain


Digoxin (Lanoxin)  125 mcg PO DAILY@1300 [facility]


   Last Admin: 01/08/18 12:22 Dose:  125 mcg


Diltiazem HCl (Cardizem Sr)  60 mg PO DAILY [facility]


   Last Admin: 01/08/18 09:37 Dose:  60 mg


Diphenhydramine HCl (Benadryl)  50 mg PO BEDTIME PRN


   PRN Reason: Sleep


   Last Admin: 01/06/18 23:52 Dose:  50 mg


Docusate Sodium (Colace)  100 mg PO BID PRN


   PRN Reason: Constipation


Folic Acid (Folic Acid)  1 mg PO DAILY [facility]


   Last Admin: 01/08/18 09:37 Dose:  1 mg


Furosemide (Lasix)  40 mg PO BIDDIURETIC [facility]


   Last Admin: 01/08/18 07:50 Dose:  40 mg


Gentamicin Sulfate (Garamycin 0.3% Ophth Soln)  0 ml EYEBOTH Q4HWA [facility]


   Stop: 01/10/18 23:01


   Last Admin: 01/08/18 11:17 Dose:  4 drop


Guaifenesin/Codeine Phosphate (Robitussin Ac)  10 ml PO TID [facility]


   Last Admin: 01/08/18 09:43 Dose:  10 ml


Levofloxacin/Dextrose 750 mg/ (Premix)  150 mls @ 100 mls/hr IV Q24H [facility]


   Last Admin: 01/08/18 08:35 Dose:  100 mls/hr


Insulin Aspart (Novolog)  0 unit SUBCUT QIDACANDBED [facility]


   PRN Reason: Protocol


   Last Admin: 01/08/18 12:24 Dose:  9 units


Lactobacillus Rhamnosus (Culturelle)  2 cap PO BID [facility]


Magnesium Hydroxide (Milk Of Magnesia)  30 ml PO Q12H PRN


   PRN Reason: Constipation


Melatonin (Melatonin)  6 - 12 mg PO BEDTIME PRN


   PRN Reason: Insomnia


   Last Admin: 01/07/18 20:50 Dose:  6 mg


Metformin HCl (Glucophage)  500 mg PO ACBREAKFAST [facility]


   Last Admin: 01/08/18 07:51 Dose:  500 mg


Metformin HCl (Glucophage)  1,000 mg PO WITHDINNER [facility]


   Last Admin: 01/07/18 16:51 Dose:  1,000 mg


Metoprolol Tartrate (Lopressor)  25 mg PO BID [facility]


   Last Admin: 01/08/18 09:37 Dose:  25 mg


Morphine Sulfate (Morphine)  2 mg IVPUSH Q2H PRN


   PRN Reason: Pain (severe 7-10)


Ondansetron HCl (Zofran Odt)  4 mg PO Q6H PRN


   PRN Reason: Nausea able to take PO


Ondansetron HCl (Zofran)  4 mg IV Q4H PRN


   PRN Reason: Nausea/Vomiting


Pantoprazole Sodium (Protonix***)  40 mg PO ACBREAKFAST [facility]


   Last Admin: 01/08/18 07:51 Dose:  40 mg


Phenyleph/Shark Oil/Min Oil/Petrol (Preparation H Oint)  0 gm RECTAL ASDIRECTED 

PRN


   PRN Reason: Hemorrhoids


   Last Admin: 01/07/18 22:06 Dose:  1 applic


Pneumococcal Polyvalent Vaccine (Pneumovax 23)  0.5 ml IM .ONCE ONE


   Stop: 01/08/18 14:01


Potassium Chloride (Klor-Con M20)  40 meq PO BIDMEALS [facility]


   Last Admin: 01/08/18 07:57 Dose:  40 meq


Prednisone (Prednisone)  20 mg PO DAILY [facility]


Warfarin Sodium (Coumadin)  4 mg PO DAILY@1300 [facility]


   Last Admin: 01/08/18 12:24 Dose:  4 mg


Zolpidem Tartrate (Ambien)  5 mg PO BEDTIME PRN


   PRN Reason: Sleep





Discontinued Medications





Ferrous Sulfate (Ferrous Sulfate)  325 mg PO BID [facility]


   Last Admin: 01/06/18 08:47 Dose:  325 mg


Guaifenesin/Codeine Phosphate (Robitussin Ac)  10 ml PO Q6H [facility]


   Last Admin: 01/06/18 02:19 Dose:  Not Given


Guaifenesin/Codeine Phosphate (Robitussin Ac)  10 ml PO Q6H [facility]


   Last Admin: 01/06/18 13:37 Dose:  10 ml


Ceftriaxone Sodium 2 gm/ (Sodium Chloride)  50 mls @ 100 mls/hr IV Q8H [facility]


   Last Admin: 01/06/18 02:35 Dose:  Not Given


Sodium Chloride (Normal Saline)  1,000 mls @ 125 mls/hr IV ASDIRECTED [facility]


   Last Admin: 01/06/18 08:52 Dose:  125 mls/hr


Ceftriaxone Sodium 2 gm/ (Sodium Chloride)  50 mls @ 100 mls/hr IV Q24H [facility]


   Last Admin: 01/06/18 02:19 Dose:  100 mls/hr


Ceftriaxone Sodium 2 gm/ (Sodium Chloride)  50 mls @ 100 mls/hr IV Q24H [facility]


   Last Admin: 01/06/18 21:39 Dose:  100 mls/hr


Sodium Chloride (Normal Saline)  1,000 mls @ 75 mls/hr IV ASDIRECTED [facility]


   Last Admin: 01/06/18 21:39 Dose:  75 mls/hr


Insulin Aspart (Novolog)  0 unit SUBCUT QIDACANDBED [facility]


   PRN Reason: Protocol


   Last Admin: 01/07/18 16:50 Dose:  Not Given


Insulin Aspart (Novolog)  10 unit SUBCUT ONETIME ONE


   Stop: 01/07/18 16:46


   Last Admin: 01/07/18 16:45 Dose:  10 units


Lorazepam (Ativan)  1 mg IV Q6H PRN


   PRN Reason: Nausea/Vomiting


Melatonin (Melatonin)  6 mg PO BEDTIME PRN


   PRN Reason: Insomnia


   Last Admin: 01/06/18 21:42 Dose:  6 mg


Methylprednisolone Sodium Succinate (Solu-Medrol)  125 mg IVPUSH Q8H [facility]


   Last Admin: 01/06/18 02:19 Dose:  125 mg


Methylprednisolone Sodium Succinate (Solu-Medrol)  62.5 mg IVPUSH Q8H [facility]


   Stop: 01/07/18 02:00


   Last Admin: 01/07/18 02:52 Dose:  62.5 mg


Prednisone (Prednisone)  20 mg PO BIDAC [facility]


   Last Admin: 01/08/18 07:50 Dose:  20 mg


Warfarin Sodium (Coumadin)  4 mg PO DAILY@1300 [facility]











- Exam


Quality Assessment: DVT Prophylaxis.  No: Supplemental Oxygen


General: Alert, Oriented, Cooperative, Mild Distress


Lungs: Normal Respiratory Effort, Decreased Breath Sounds, Rhonchi, Wheezing.  

No: Crackles, Rales, Rub, Stridor


Cardiovascular: Regular Rate, Regular Rhythm, No Murmurs


GI/Abdominal Exam: Soft, Non-Tender, No Organomegaly, No Distention


Extremities: Non-Tender, No Pedal Edema


Skin: Warm, Dry, Intact





- Problem List Review


Problem List Initiated/Reviewed/Updated: Yes





- My Orders


Last 24 Hours: 


My Active Orders





01/08/18 12:47


CLOSTRIDIUM DIFFICILE BY PCR [RM] Stat 





01/08/18 13:00


Lactobacillus Rhamnosus GG [Culturelle]   2 cap PO BID 





01/09/18 05:00


BASIC METABOLIC PANEL,BMP [CHEM] Timed 


CBC WITH AUTO DIFF [HEME] Timed 


INR,PT,PROTHROMBIN TIME [COAG] Timed 





01/09/18 09:00


predniSONE   20 mg PO DAILY 














- Plan


Plan:: 








ASSESSMENT AND PLAN - 





Right lower lober pneumonia with hypoxia  - improved since admission, no longer 

requiring supplemental oxygen.


-IV Levofloxacin 750mg every 24 hours


-IV Rocephin 2mg every 24 hours


-Saline lock IV


-Follow-up sputum culture


-Scheduled and as needed nebulizers


-Prednisone 20 mg by mouth daily





Diarrhea-likely secondary to current antibiotics


-C. difficile


-Probiotic twice daily





Diabetes mellitus type 2 with Steroid-induced hyperglycemia - sugars have risen 

with steroids.


-Continue metformin


-High-dose sliding scale insulin





Essential hypertension - blood pressure currently normal but will need to be 

monitored closely with active infection. Usual medications will be continued 

unless blood pressure trends down





Hx of artificial Heart Valve replacement - INR therapeutic


-Coumadin therapy


-continue home medication





Bilateral Conjunctivitis - clinically much better


-Gentamycin opth ointment as directed


-eye care cleansing





Maintenance issues - 


- DVT prophylaxis - warfarin


- GI prophylaxis - PPI


- Nutrition - regular diet


- [place] catheter - not indicated





Disposition - anticipate discharge home after the hospital stay





Primary care physician - [facility]

## 2018-01-09 RX ADMIN — ALBUTEROL SULFATE PRN MG: 2.5 SOLUTION RESPIRATORY (INHALATION) at 02:38

## 2018-01-09 RX ADMIN — LEVOFLOXACIN SCH MLS/HR: 750 INJECTION, SOLUTION INTRAVENOUS at 08:35

## 2018-01-09 RX ADMIN — Medication SCH CAP: at 08:36

## 2018-01-09 RX ADMIN — GUAIFENESIN AND CODEINE PHOSPHATE SCH ML: 10; 100 LIQUID ORAL at 08:46

## 2018-01-09 RX ADMIN — GUAIFENESIN AND CODEINE PHOSPHATE SCH ML: 10; 100 LIQUID ORAL at 15:40

## 2018-01-09 RX ADMIN — Medication SCH CAP: at 21:28

## 2018-01-09 RX ADMIN — GUAIFENESIN AND CODEINE PHOSPHATE SCH ML: 10; 100 LIQUID ORAL at 21:34

## 2018-01-09 RX ADMIN — DILTIAZEM HYDROCHLORIDE SCH MG: 60 CAPSULE, EXTENDED RELEASE ORAL at 08:36

## 2018-01-09 RX ADMIN — POTASSIUM CHLORIDE SCH MEQ: 1500 TABLET, EXTENDED RELEASE ORAL at 08:31

## 2018-01-09 RX ADMIN — POTASSIUM CHLORIDE SCH MEQ: 1500 TABLET, EXTENDED RELEASE ORAL at 16:45

## 2018-01-09 NOTE — PCM.PN
- General Info


Date of Service: 01/09/18


Subjective Update: 





Mr. Petersen has felt further improved over the past 24 hours with less shortness 

of breath and good improvement in his cough. Unfortunately does have 

significant peripheral edema likely related to fluids that he received on 

admission. Vital signs have been good and he has been afebrile.





- Review of Systems


General: Denies: Fever, Weakness, Chills


Pulmonary: Reports: Cough, Wheezing.  Denies: Shortness of Breath, Sputum, 

Hemoptysis


Cardiovascular: Reports: Dyspnea on Exertion, Edema.  Denies: Chest Pain, 

Palpitations, Orthopnea, PND


Gastrointestinal: Reports: No Symptoms


Genitourinary: Reports: No Symptoms





- Patient Data


Vitals - Most Recent: 


 Last Vital Signs











Temp  97.4 F   01/09/18 15:41


 


Pulse  62   01/09/18 15:41


 


Resp  18   01/09/18 15:41


 


BP  114/61   01/09/18 15:41


 


Pulse Ox  93 L  01/09/18 15:41











Weight - Most Recent: 246 lb 6.006 oz


I&O - Last 24 Hours: 


 Intake & Output











 01/09/18 01/09/18 01/09/18





 06:59 14:59 22:59


 


Intake Total  500 


 


Output Total 300 1325 


 


Balance -300 -825 











Lab Results Last 24 Hours: 


 Laboratory Results - last 24 hr











  01/09/18 01/09/18 01/09/18 Range/Units





  05:10 05:10 05:10 


 


WBC  13.1 H    (4.5-11.0)  K/uL


 


RBC  4.86    (4.30-5.90)  M/uL


 


Hgb  14.6    (12.0-15.0)  g/dL


 


Hct  45.1    (40.0-54.0)  %


 


MCV  93    (80-98)  fL


 


MCH  30    (27-31)  pg


 


MCHC  32    (32-36)  %


 


Plt Count  200    (150-400)  K/uL


 


Add Manual Diff  Yes    


 


Neutrophils % (Manual)  85 H    (36-66)  %


 


Band Neutrophils %  4 L    (5-11)  %


 


Lymphocytes % (Manual)  4 L    (24-44)  %


 


Monocytes % (Manual)  7 H    (2-6)  %


 


PT   38.7 H   (9.5-12.0)  sec


 


INR   3.44 H   (0.80-1.20)  


 


Sodium    141  (140-148)  mmol/L


 


Potassium    4.3  (3.6-5.2)  mmol/L


 


Chloride    106  (100-108)  mmol/L


 


Carbon Dioxide    30  (21-32)  mmol/L


 


Anion Gap    5.5  (5.0-14.0)  mmol/L


 


BUN    37 H  (7-18)  mg/dL


 


Creatinine    0.9  (0.8-1.3)  mg/dL


 


Est Cr Clr Drug Dosing    60.98  mL/min


 


Estimated GFR (MDRD)    > 60  (>60)  


 


Glucose    115 H  ()  mg/dL


 


Calcium    7.1 L  (8.5-10.1)  mg/dL











[facility] Results Last 24 Hours: 


 Microbiology











 01/08/18 14:10 Clostridium difficile (PCR) - Final





 Stool / Feces    NEGATIVE CDIFF TOXIN











Med Orders - Current: 


 Current Medications





Acetaminophen (Tylenol)  650 mg PO Q4H PRN


   PRN Reason: Pain (Mild 1-3)/fever


Hydrocodone Bitart/Acetaminophen (Norco 325-5 Mg)  1 tab PO Q4H PRN


   PRN Reason: Pain (moderate 4-6)


Albuterol (Proventil Neb Soln)  2.5 mg NEB Q4H PRN


   PRN Reason: Shortness Of Breath/wheezing


   Last Admin: 01/09/18 02:38 Dose:  2.5 mg


Albuterol/Ipratropium (Duoneb 3.0-0.5 Mg/3 Ml)  3 ml NEB QIDRT [facility]


   Last Admin: 01/09/18 14:40 Dose:  3 ml


Amiodarone HCl (Cordarone)  200 mg PO QAM [facility]


   Last Admin: 01/09/18 08:36 Dose:  200 mg


Artificial Tears (Natural Balance Tears)  0 ml EYEBOTH ASDIRECTED PRN


   PRN Reason: Dry Eyes


Ascorbic Acid (Vitamin C)  1,000 mg PO DAILY [facility]


   Last Admin: 01/09/18 08:37 Dose:  1,000 mg


Bisacodyl (Dulcolax)  5 mg PO DAILY PRN


   PRN Reason: Constipation


Capsaicin (Zostrix 0.025% Crm)  0 gm TOP ASDIRECTED PRN


   PRN Reason: Pain


Digoxin (Lanoxin)  125 mcg PO DAILY@1300 [facility]


   Last Admin: 01/09/18 13:28 Dose:  125 mcg


Diltiazem HCl (Cardizem Sr)  60 mg PO DAILY [facility]


   Last Admin: 01/09/18 08:36 Dose:  60 mg


Diphenhydramine HCl (Benadryl)  50 mg PO BEDTIME PRN


   PRN Reason: Sleep


   Last Admin: 01/06/18 23:52 Dose:  50 mg


Docusate Sodium (Colace)  100 mg PO BID PRN


   PRN Reason: Constipation


Folic Acid (Folic Acid)  1 mg PO DAILY [facility]


   Last Admin: 01/09/18 08:37 Dose:  1 mg


Furosemide (Lasix)  80 mg PO BIDDIURETIC [facility]


Gentamicin Sulfate (Garamycin 0.3% Ophth Soln)  0 ml EYEBOTH Q4HWA [facility]


   Stop: 01/10/18 23:01


   Last Admin: 01/09/18 15:34 Dose:  2 drop


Guaifenesin/Codeine Phosphate (Robitussin Ac)  10 ml PO TID [facility]


   Last Admin: 01/09/18 15:40 Dose:  10 ml


Insulin Aspart (Novolog)  0 unit SUBCUT QIDACANDBED [facility]


   PRN Reason: Protocol


   Last Admin: 01/09/18 11:49 Dose:  3 units


Lactobacillus Rhamnosus (Culturelle)  2 cap PO BID [facility]


   Last Admin: 01/09/18 08:36 Dose:  2 cap


Levofloxacin (Levaquin)  750 mg PO Q24H [facility]


Magnesium Hydroxide (Milk Of Magnesia)  30 ml PO Q12H PRN


   PRN Reason: Constipation


Melatonin (Melatonin)  6 - 12 mg PO BEDTIME PRN


   PRN Reason: Insomnia


   Last Admin: 01/08/18 21:43 Dose:  6 mg


Metformin HCl (Glucophage)  500 mg PO ACBREAKFAST [facility]


   Last Admin: 01/09/18 08:30 Dose:  500 mg


Metformin HCl (Glucophage)  1,000 mg PO WITHDINNER [facility]


   Last Admin: 01/08/18 17:39 Dose:  1,000 mg


Metoprolol Tartrate (Lopressor)  25 mg PO BID [facility]


   Last Admin: 01/09/18 08:37 Dose:  25 mg


Morphine Sulfate (Morphine)  2 mg IVPUSH Q2H PRN


   PRN Reason: Pain (severe 7-10)


Ondansetron HCl (Zofran Odt)  4 mg PO Q6H PRN


   PRN Reason: Nausea able to take PO


Ondansetron HCl (Zofran)  4 mg IV Q4H PRN


   PRN Reason: Nausea/Vomiting


Pantoprazole Sodium (Protonix***)  40 mg PO ACBREAKFAST [facility]


   Last Admin: 01/09/18 08:30 Dose:  40 mg


Phenyleph/Shark Oil/Min Oil/Petrol (Preparation H Oint)  0 gm RECTAL ASDIRECTED 

PRN


   PRN Reason: Hemorrhoids


   Last Admin: 01/08/18 17:40 Dose:  1 applic


Potassium Chloride (Klor-Con M20)  40 meq PO BIDMEALS [facility]


   Last Admin: 01/09/18 08:31 Dose:  40 meq


Zolpidem Tartrate (Ambien)  5 mg PO BEDTIME PRN


   PRN Reason: Sleep





Discontinued Medications





Ferrous Sulfate (Ferrous Sulfate)  325 mg PO BID [facility]


   Last Admin: 01/06/18 08:47 Dose:  325 mg


Furosemide (Lasix)  40 mg PO BIDDIURETIC [facility]


   Last Admin: 01/09/18 15:32 Dose:  40 mg


Guaifenesin/Codeine Phosphate (Robitussin Ac)  10 ml PO Q6H [facility]


   Last Admin: 01/06/18 02:19 Dose:  Not Given


Guaifenesin/Codeine Phosphate (Robitussin Ac)  10 ml PO Q6H [facility]


   Last Admin: 01/06/18 13:37 Dose:  10 ml


Ceftriaxone Sodium 2 gm/ (Sodium Chloride)  50 mls @ 100 mls/hr IV Q8H [facility]


   Last Admin: 01/06/18 02:35 Dose:  Not Given


Levofloxacin/Dextrose 750 mg/ (Premix)  150 mls @ 100 mls/hr IV Q24H [facility]


   Last Admin: 01/09/18 08:35 Dose:  100 mls/hr


Sodium Chloride (Normal Saline)  1,000 mls @ 125 mls/hr IV ASDIRECTED [facility]


   Last Admin: 01/06/18 08:52 Dose:  125 mls/hr


Ceftriaxone Sodium 2 gm/ (Sodium Chloride)  50 mls @ 100 mls/hr IV Q24H [facility]


   Last Admin: 01/06/18 02:19 Dose:  100 mls/hr


Ceftriaxone Sodium 2 gm/ (Sodium Chloride)  50 mls @ 100 mls/hr IV Q24H [facility]


   Last Admin: 01/06/18 21:39 Dose:  100 mls/hr


Sodium Chloride (Normal Saline)  1,000 mls @ 75 mls/hr IV ASDIRECTED [facility]


   Last Admin: 01/06/18 21:39 Dose:  75 mls/hr


Insulin Aspart (Novolog)  0 unit SUBCUT QIDACANDBED [facility]


   PRN Reason: Protocol


   Last Admin: 01/07/18 16:50 Dose:  Not Given


Insulin Aspart (Novolog)  10 unit SUBCUT ONETIME ONE


   Stop: 01/07/18 16:46


   Last Admin: 01/07/18 16:45 Dose:  10 units


Lorazepam (Ativan)  1 mg IV Q6H PRN


   PRN Reason: Nausea/Vomiting


Melatonin (Melatonin)  6 mg PO BEDTIME PRN


   PRN Reason: Insomnia


   Last Admin: 01/06/18 21:42 Dose:  6 mg


Methylprednisolone Sodium Succinate (Solu-Medrol)  125 mg IVPUSH Q8H [facility]


   Last Admin: 01/06/18 02:19 Dose:  125 mg


Methylprednisolone Sodium Succinate (Solu-Medrol)  62.5 mg IVPUSH Q8H [facility]


   Stop: 01/07/18 02:00


   Last Admin: 01/07/18 02:52 Dose:  62.5 mg


Pneumococcal Polyvalent Vaccine (Pneumovax 23)  0.5 ml IM .ONCE ONE


   Stop: 01/09/18 10:01


   Last Admin: 01/09/18 13:32 Dose:  Not Given


Prednisone (Prednisone)  20 mg PO BIDAC [facility]


   Last Admin: 01/08/18 07:50 Dose:  20 mg


Prednisone (Prednisone)  20 mg PO DAILY@0800 [facility]


   Last Admin: 01/09/18 08:33 Dose:  20 mg


Warfarin Sodium (Coumadin)  4 mg PO DAILY@1300 [facility]


Warfarin Sodium (Coumadin)  4 mg PO DAILY@1300 [facility]


   Last Admin: 01/08/18 12:24 Dose:  4 mg


Warfarin Sodium (Coumadin)  2 mg PO ONETIME ONE


   Stop: 01/09/18 13:01


   Last Admin: 01/09/18 13:30 Dose:  2 mg











- Exam


Quality Assessment: DVT Prophylaxis


General: Alert, Oriented, Cooperative, No Acute Distress


Lungs: Decreased Breath Sounds, Wheezing.  No: Crackles, Rales, Rhonchi, Rub, 

Stridor


Cardiovascular: Regular Rate, Regular Rhythm, No Murmurs


GI/Abdominal Exam: Soft, Non-Tender, No Organomegaly, No Distention


Extremities: Non-Tender, Pedal Edema


Skin: Warm, Dry, Intact





- Problem List Review


Problem List Initiated/Reviewed/Updated: Yes





- My Orders


Last 24 Hours: 


My Active Orders





01/09/18 15:39


Furosemide [Lasix]   80 mg PO BIDDIURETIC 





01/09/18 16:00


Levofloxacin [Levaquin]   750 mg PO Q24H 





01/10/18 05:11


INR,PT,PROTHROMBIN TIME [COAG] AM 














- Plan


Plan:: 








ASSESSMENT AND PLAN - 





Right lower lober pneumonia with hypoxia  - improved since admission, no longer 

requiring supplemental oxygen. Further improvement since yesterday with less 

shortness of breath and cough


-Levofloxacin 750mg by mouth every 24 hours


-Saline lock IV


-Follow-up sputum culture


-Scheduled and as needed nebulizers


-Discontinue prednisone





Fluid overload-with peripheral edema, likely secondary to IV fluids.


-Give usual dose of metolazone now


-Increase furosemide to 80 mg by mouth twice daily





Diarrhea-likely secondary to current antibiotics, C. difficile was negative. 

Diarrhea has improved


-Probiotic twice daily





Diabetes mellitus type 2 with Steroid-induced hyperglycemia - sugars have risen 

with steroids.


-Continue metformin


-High-dose sliding scale insulin





Essential hypertension - blood pressure currently normal but will need to be 

monitored closely with active infection. Usual medications will be continued 

unless blood pressure trends down





Hx of artificial Heart Valve replacement - INR therapeutic


-Coumadin therapy


-continue home medication





Bilateral Conjunctivitis - clinically much better


-Gentamycin opth ointment as directed


-eye care cleansing





Maintenance issues - 


- DVT prophylaxis - warfarin


- GI prophylaxis - PPI


- Nutrition - regular diet


- [place] catheter - not indicated





Disposition - anticipate discharge home after the hospital stay





Primary care physician - [facility]

## 2018-01-10 RX ADMIN — POTASSIUM CHLORIDE SCH MEQ: 1500 TABLET, EXTENDED RELEASE ORAL at 07:56

## 2018-01-10 RX ADMIN — Medication SCH CAP: at 08:54

## 2018-01-10 RX ADMIN — GUAIFENESIN AND CODEINE PHOSPHATE SCH ML: 10; 100 LIQUID ORAL at 10:08

## 2018-01-10 RX ADMIN — DILTIAZEM HYDROCHLORIDE SCH MG: 60 CAPSULE, EXTENDED RELEASE ORAL at 08:57

## 2018-01-10 NOTE — [id number]
**Discharge Summary





- Hospital Course


Brief History: Mr. Petersen is an 86-year-old gentleman who was admitted through 

the emergency department with a recent history of cough, shortness of breath, 

and weakness. On evaluation in the emergency department was found to be hypoxic 

with a right lung infiltrate consistent with pneumonia.





- Discharge Data


Discharge Date: 01/10/18


Discharge Disposition: Home, [facility]


Condition: Fair





- Discharge Diagnosis/Problem(s)


(1) Pneumonia


SNOMED Code(s): 975690775


   ICD Code: J18.9 - PNEUMONIA, UNSPECIFIED ORGANISM   Status: Acute   Priority

: High   Current Visit: Yes   


Qualifiers: 


   Pneumonia type: due to unspecified organism   Laterality: right   Lung 

location: lower lobe of lung   Qualified Code(s): J18.1 - Lobar pneumonia, 

unspecified organism   





(2) Hypoxia


SNOMED Code(s): 539396639


   ICD Code: R09.02 - HYPOXEMIA   Status: Acute   Current Visit: Yes   





(3) Conjunctivitis of both eyes


SNOMED Code(s): 8684915


   ICD Code: H10.9 - UNSPECIFIED CONJUNCTIVITIS   Status: Acute   Priority: 

High   Current Visit: Yes   


Qualifiers: 


   Conjunctivitis type: acute   Acute conjunctivitis type: bacterial   

Qualified Code(s): H10.33 - Unspecified acute conjunctivitis, bilateral   





(4) History of artificial heart valve


Status: Chronic   Priority: High   Current Visit: Yes   





- Patient Summary/Data


Consults: 


 Consultations





01/06/18 01:04


OT Evaluation and Treatment [CONS] Routine 


   Please Evaluate and Treat.


   OT Reason for Consult: Discharge Planning


   This query below is only for informational purposes and is not editable.


PT Evaluation and Treatment [CONS] Routine 


   Please Evaluate and Treat.


   PT Reason for Consult: Strengthening


   This query below is only for informational purposes and is not editable.











Hospital Course: 





Mr. Petersen is an 86-year-old gentleman who developed symptoms of shortness of 

breath, cough, and weakness. He was seen and evaluated in the emergency 

department of [facility] in [facility]. Chest x-ray showed 

evidence of a right lung infiltrate and he was found to be hypoxic. There was 

no evidence of sepsis on initial evaluation. Blood cultures were obtained and 

he was given IV fluids for hydration. Antibiotics were initiated with 

levofloxacin 750 mg every 24 hours. Unfortunately no beds were available at 

their facility and he was transferred here for direct admission for further 

management of his pneumonia. He received supplemental oxygen and ongoing 

antibiotic therapy. At the time of discharge cultures have remained negative 

with no significant growth. Hospital course was complicated by fluid retention 

and peripheral edema as well as diarrhea. Prior to discharge C. difficile was 

obtained and found to be negative. He was given increased dose of oral 

furosemide and had good diuresis but not total resolution of his peripheral 

edema. INR was monitored regularly because of chronic anticoagulation with 

warfarin because of a prosthetic heart valve. INR was found to be elevated 

likely secondary to interaction with antibiotic therapy. Warfarin will be held 

today, for the next 2 days he will take 2 mg daily, INR will be obtained on 

Friday, January 12. Because of ongoing peripheral edema he will take 80 mg of 

furosemide twice daily for the next 2 days and then resume his usual dose of 40 

mg twice daily on Friday, January 12. Follow-up with his primary care provider 

will be scheduled within one week. Activity will be as tolerated and he's 

encouraged to follow a 2 g sodium diet and keep his legs elevated while 

sitting. Ofloxacin 500 mg daily will be continued over the next 3 days and then 

discontinued. He will be treated with probiotic therapy one tablet twice daily 

for the next month.





- Patient Instructions


Diet: Low Sodium


Activity: As Tolerated


Activity, Other: Please keep legs elevated while sitting


Other/Special Instructions: Please schedule follow-up appointment with primary 

care provider within one week. Please arrange for home care with home physical 

therapy and occupational therapy. Instruct patient to double up on his 

furosemide 80 mg twice daily for the next 2 days. On Friday, January 12 resume 

usual dose of furosemide. While on levofloxacin, hold warfarin today, then 2 mg 

daily for the next 2 days. Resume usual dose of warfarin when he has completed 

his course of levofloxacin. Have home care check INR on Friday, January 12 and 

called to primary care provider.





- Discharge Plan


Prescriptions/Med Rec: 


Lactobacillus Rhamnosus GG [Culturelle] 1 cap PO BID #60 cap


Levofloxacin [Levaquin] 500 mg PO Q24H #3 tablet


Home Medications: 


 Home Meds





Acetaminophen [Mapap] 650 mg PO Q6H PRN 01/06/18 [History]


Acetaminophen/HYDROcodone [Norco 325-5 MG] 0.5 - 1 tab PO Q4H PRN 01/06/18 [

History]


Albuterol Sulfate 2.5 mg IH Q4H PRN 01/06/18 [History]


Amiodarone [Cordarone] 200 mg PO QAM 01/06/18 [History]


Ascorbic Acid 1,000 mg PO DAILY 01/06/18 [History]


Capsaicin [Zostrix 0.025% Crm] 0.025 gm TP ASDIRECTED PRN 01/06/18 [History]


Digoxin 125 mcg PO DAILY 01/06/18 [History]


Diltiazem [Cardizem SR] 60 mg PO DAILY 01/06/18 [History]


Docusate Sodium 100 mg PO BID 01/06/18 [History]


Ferrous Sulfate 325 mg PO BID 01/06/18 [History]


Folic Acid 1 mg PO DAILY 01/06/18 [History]


Furosemide [Lasix] 40 mg PO BID 01/06/18 [History]


Gentamicin [Gentak 0.3% Ophth Oint] 1 - 2 drop EYEBOTH Q4HWA 01/06/18 [History]


Metoprolol Tartrate 25 mg PO BID 01/06/18 [History]


Multivits,Ca,Min/Iron/FA/Lycop [Centrum Men's Tablet] 1 tab PO QAM 01/06/18 [

History]


Omega-3S/DHA/Epa/Fish Oil [Fish Oil Omega-3 Softgel] 1 cap PO BID 01/06/18 [

History]


Omeprazole 20 mg PO BIDAC 01/06/18 [History]


Polyvinyl Alcohol [LiquiTears 1.4% Ophth Soln] 1 drop EYEBOTH ASDIRECTED PRN 01/ 06/18 [History]


Potassium Chloride [Klor-Con M20] 40 meq PO BID 01/06/18 [History]


Warfarin Sodium [Coumadin] 4 mg PO DAILY 01/06/18 [History]


diphenhydrAMINE [Benadryl] 50 mg PO BEDTIME PRN 01/06/18 [History]


metFORMIN [Glucophage] 500 mg PO TIDMEALS 01/06/18 [History]


Metolazone 2.5 mg PO ASDIRECTED 01/09/18 [History]


Lactobacillus Rhamnosus GG [Culturelle] 1 cap PO BID #60 cap 01/10/18 [Rx]


Levofloxacin [Levaquin] 500 mg PO Q24H #3 tablet 01/10/18 [Rx]











- Patient Data


Vitals - Most Recent: 


 Last Vital Signs











Temp  96.3 F   01/10/18 07:40


 


Pulse  64   01/10/18 10:52


 


Resp  17   01/10/18 07:40


 


BP  103/62   01/10/18 08:55


 


Pulse Ox  97   01/10/18 07:40











Weight - Most Recent: 246 lb 6.006 oz


I&O - Last 24 hours: 


 Intake & Output











 01/09/18 01/10/18 01/10/18





 22:59 06:59 14:59


 


Intake Total 1590  


 


Output Total 2900 2000 


 


Balance -1310 -2000 











Lab Results - Last 24 hrs: 


 Laboratory Results - last 24 hr











  01/10/18 Range/Units





  05:42 


 


PT  40.9 H  (9.5-12.0)  sec


 


INR  3.62 H  (0.80-1.20)  











Med Orders - Current: 


 Current Medications





Acetaminophen (Tylenol)  650 mg PO Q4H PRN


   PRN Reason: Pain (Mild 1-3)/fever


Hydrocodone Bitart/Acetaminophen (Norco 325-5 Mg)  1 tab PO Q4H PRN


   PRN Reason: Pain (moderate 4-6)


Albuterol (Proventil Neb Soln)  2.5 mg NEB Q4H PRN


   PRN Reason: Shortness Of Breath/wheezing


   Last Admin: 01/09/18 02:38 Dose:  2.5 mg


Albuterol/Ipratropium (Duoneb 3.0-0.5 Mg/3 Ml)  3 ml NEB QIDRT [facility]


   Last Admin: 01/10/18 10:50 Dose:  3 ml


Amiodarone HCl (Cordarone)  200 mg PO QAM [facility]


   Last Admin: 01/10/18 08:57 Dose:  200 mg


Artificial Tears (Natural Balance Tears)  0 ml EYEBOTH ASDIRECTED PRN


   PRN Reason: Dry Eyes


Ascorbic Acid (Vitamin C)  1,000 mg PO DAILY [facility]


   Last Admin: 01/10/18 08:54 Dose:  1,000 mg


Bisacodyl (Dulcolax)  5 mg PO DAILY PRN


   PRN Reason: Constipation


Capsaicin (Zostrix 0.025% Crm)  0 gm TOP ASDIRECTED PRN


   PRN Reason: Pain


Digoxin (Lanoxin)  125 mcg PO DAILY@1300 [facility]


   Last Admin: 01/09/18 13:28 Dose:  125 mcg


Diltiazem HCl (Cardizem Sr)  60 mg PO DAILY [facility]


   Last Admin: 01/10/18 08:57 Dose:  60 mg


Diphenhydramine HCl (Benadryl)  50 mg PO BEDTIME PRN


   PRN Reason: Sleep


   Last Admin: 01/06/18 23:52 Dose:  50 mg


Docusate Sodium (Colace)  100 mg PO BID PRN


   PRN Reason: Constipation


Folic Acid (Folic Acid)  1 mg PO DAILY [facility]


   Last Admin: 01/10/18 08:54 Dose:  1 mg


Furosemide (Lasix)  80 mg PO BIDDIURETIC [facility]


   Last Admin: 01/10/18 07:57 Dose:  80 mg


Gentamicin Sulfate (Garamycin 0.3% Ophth Soln)  0 ml EYEBOTH Q4HWA [facility]


   Stop: 01/10/18 23:01


   Last Admin: 01/10/18 10:15 Dose:  1 drop


Guaifenesin/Codeine Phosphate (Robitussin Ac)  10 ml PO TID [facility]


   Last Admin: 01/10/18 10:08 Dose:  10 ml


Insulin Aspart (Novolog)  0 unit SUBCUT QIDACANDBED [facility]


   PRN Reason: Protocol


   Last Admin: 01/10/18 08:52 Dose:  Not Given


Lactobacillus Rhamnosus (Culturelle)  2 cap PO BID [facility]


   Last Admin: 01/10/18 08:54 Dose:  2 cap


Levofloxacin (Levaquin)  750 mg PO Q24H [facility]


   Last Admin: 01/10/18 08:56 Dose:  750 mg


Magnesium Hydroxide (Milk Of Magnesia)  30 ml PO Q12H PRN


   PRN Reason: Constipation


Melatonin (Melatonin)  6 - 12 mg PO BEDTIME PRN


   PRN Reason: Insomnia


   Last Admin: 01/09/18 22:15 Dose:  6 mg


Metformin HCl (Glucophage)  500 mg PO ACBREAKFAST [facility]


   Last Admin: 01/10/18 07:55 Dose:  500 mg


Metformin HCl (Glucophage)  1,000 mg PO WITHDINNER [facility]


   Last Admin: 01/09/18 16:44 Dose:  1,000 mg


Metolazone (Zaroxolyn)  2.5 mg PO TuSa@0800 [facility]


Metoprolol Tartrate (Lopressor)  25 mg PO BID [facility]


   Last Admin: 01/10/18 08:55 Dose:  25 mg


Morphine Sulfate (Morphine)  2 mg IVPUSH Q2H PRN


   PRN Reason: Pain (severe 7-10)


Ondansetron HCl (Zofran Odt)  4 mg PO Q6H PRN


   PRN Reason: Nausea able to take PO


Ondansetron HCl (Zofran)  4 mg IV Q4H PRN


   PRN Reason: Nausea/Vomiting


Pantoprazole Sodium (Protonix***)  40 mg PO ACBREAKFAST [facility]


   Last Admin: 01/10/18 07:56 Dose:  40 mg


Phenyleph/Shark Oil/Min Oil/Petrol (Preparation H Oint)  0 gm RECTAL ASDIRECTED 

PRN


   PRN Reason: Hemorrhoids


   Last Admin: 01/08/18 17:40 Dose:  1 applic


Potassium Chloride (Klor-Con M20)  40 meq PO BIDMEALS [facility]


   Last Admin: 01/10/18 07:56 Dose:  40 meq


Zolpidem Tartrate (Ambien)  5 mg PO BEDTIME PRN


   PRN Reason: Sleep





Discontinued Medications





Ferrous Sulfate (Ferrous Sulfate)  325 mg PO BID [facility]


   Last Admin: 01/06/18 08:47 Dose:  325 mg


Furosemide (Lasix)  40 mg PO BIDDIURETIC [facility]


   Last Admin: 01/09/18 15:32 Dose:  40 mg


Furosemide (Lasix)  40 mg PO ONETIME ONE


   Stop: 01/09/18 15:53


   Last Admin: 01/09/18 15:56 Dose:  40 mg


Guaifenesin/Codeine Phosphate (Robitussin Ac)  10 ml PO Q6H [facility]


   Last Admin: 01/06/18 02:19 Dose:  Not Given


Guaifenesin/Codeine Phosphate (Robitussin Ac)  10 ml PO Q6H [facility]


   Last Admin: 01/06/18 13:37 Dose:  10 ml


Ceftriaxone Sodium 2 gm/ (Sodium Chloride)  50 mls @ 100 mls/hr IV Q8H [facility]


   Last Admin: 01/06/18 02:35 Dose:  Not Given


Levofloxacin/Dextrose 750 mg/ (Premix)  150 mls @ 100 mls/hr IV Q24H [facility]


   Last Admin: 01/09/18 08:35 Dose:  100 mls/hr


Sodium Chloride (Normal Saline)  1,000 mls @ 125 mls/hr IV ASDIRECTED [facility]


   Last Admin: 01/06/18 08:52 Dose:  125 mls/hr


Ceftriaxone Sodium 2 gm/ (Sodium Chloride)  50 mls @ 100 mls/hr IV Q24H [facility]


   Last Admin: 01/06/18 02:19 Dose:  100 mls/hr


Ceftriaxone Sodium 2 gm/ (Sodium Chloride)  50 mls @ 100 mls/hr IV Q24H [facility]


   Last Admin: 01/06/18 21:39 Dose:  100 mls/hr


Sodium Chloride (Normal Saline)  1,000 mls @ 75 mls/hr IV ASDIRECTED [facility]


   Last Admin: 01/06/18 21:39 Dose:  75 mls/hr


Insulin Aspart (Novolog)  0 unit SUBCUT QIDACANDBED [facility]


   PRN Reason: Protocol


   Last Admin: 01/07/18 16:50 Dose:  Not Given


Insulin Aspart (Novolog)  10 unit SUBCUT ONETIME ONE


   Stop: 01/07/18 16:46


   Last Admin: 01/07/18 16:45 Dose:  10 units


Lorazepam (Ativan)  1 mg IV Q6H PRN


   PRN Reason: Nausea/Vomiting


Melatonin (Melatonin)  6 mg PO BEDTIME PRN


   PRN Reason: Insomnia


   Last Admin: 01/06/18 21:42 Dose:  6 mg


Methylprednisolone Sodium Succinate (Solu-Medrol)  125 mg IVPUSH Q8H [facility]


   Last Admin: 01/06/18 02:19 Dose:  125 mg


Methylprednisolone Sodium Succinate (Solu-Medrol)  62.5 mg IVPUSH Q8H [facility]


   Stop: 01/07/18 02:00


   Last Admin: 01/07/18 02:52 Dose:  62.5 mg


Metolazone (Zaroxolyn)  2.5 mg PO ONETIME ONE


   Stop: 01/09/18 16:00


   Last Admin: 01/09/18 16:44 Dose:  2.5 mg


Pneumococcal Polyvalent Vaccine (Pneumovax 23)  0.5 ml IM .ONCE ONE


   Stop: 01/09/18 10:01


   Last Admin: 01/09/18 13:32 Dose:  Not Given


Prednisone (Prednisone)  20 mg PO BIDAC [facility]


   Last Admin: 01/08/18 07:50 Dose:  20 mg


Prednisone (Prednisone)  20 mg PO DAILY@0800 [facility]


   Last Admin: 01/09/18 08:33 Dose:  20 mg


Warfarin Sodium (Coumadin)  4 mg PO DAILY@1300 [facility]


Warfarin Sodium (Coumadin)  4 mg PO DAILY@1300 [facility]


   Last Admin: 01/08/18 12:24 Dose:  4 mg


Warfarin Sodium (Coumadin)  2 mg PO ONETIME ONE


   Stop: 01/09/18 13:01


   Last Admin: 01/09/18 13:30 Dose:  2 mg











*Q Meaningful Use (DIS)





- VTE *Q


VTE Criteria *Q: 








- Stroke *Q


Stroke Criteria *Q: 








- AMI *Q


AMI Criteria *Q: